# Patient Record
Sex: MALE | Race: WHITE | NOT HISPANIC OR LATINO | URBAN - METROPOLITAN AREA
[De-identification: names, ages, dates, MRNs, and addresses within clinical notes are randomized per-mention and may not be internally consistent; named-entity substitution may affect disease eponyms.]

---

## 2019-02-25 ENCOUNTER — COMMUNICATION - HEALTHEAST (OUTPATIENT)
Dept: NEUROSURGERY | Facility: CLINIC | Age: 59
End: 2019-02-25

## 2019-03-05 ENCOUNTER — RECORDS - HEALTHEAST (OUTPATIENT)
Dept: RADIOLOGY | Facility: CLINIC | Age: 59
End: 2019-03-05

## 2019-03-05 ENCOUNTER — RECORDS - HEALTHEAST (OUTPATIENT)
Dept: ADMINISTRATIVE | Facility: OTHER | Age: 59
End: 2019-03-05

## 2019-03-11 ENCOUNTER — RECORDS - HEALTHEAST (OUTPATIENT)
Dept: ADMINISTRATIVE | Facility: OTHER | Age: 59
End: 2019-03-11

## 2019-03-15 ENCOUNTER — AMBULATORY - HEALTHEAST (OUTPATIENT)
Dept: NEUROSURGERY | Facility: CLINIC | Age: 59
End: 2019-03-15

## 2019-03-15 ENCOUNTER — OFFICE VISIT - HEALTHEAST (OUTPATIENT)
Dept: NEUROSURGERY | Facility: CLINIC | Age: 59
End: 2019-03-15

## 2019-03-15 DIAGNOSIS — R29.898 WEAKNESS OF BOTH LOWER EXTREMITIES: ICD-10-CM

## 2019-03-15 RX ORDER — AMLODIPINE BESYLATE 10 MG/1
TABLET ORAL
Status: SHIPPED | COMMUNITY
Start: 2018-04-23

## 2019-03-15 RX ORDER — LISINOPRIL 40 MG/1
TABLET ORAL
Status: SHIPPED | COMMUNITY
Start: 2019-01-22

## 2019-03-15 RX ORDER — HYDROCHLOROTHIAZIDE 50 MG/1
TABLET ORAL
Status: SHIPPED | COMMUNITY
Start: 2019-02-11

## 2019-03-15 RX ORDER — METFORMIN HCL 500 MG
TABLET, EXTENDED RELEASE 24 HR ORAL
Status: SHIPPED | COMMUNITY
Start: 2019-02-07

## 2019-03-15 RX ORDER — METOPROLOL TARTRATE 50 MG
TABLET ORAL
Status: SHIPPED | COMMUNITY
Start: 2018-05-14

## 2019-03-15 RX ORDER — ATORVASTATIN CALCIUM 10 MG/1
TABLET, FILM COATED ORAL
Status: SHIPPED | COMMUNITY
Start: 2018-05-01

## 2019-03-15 RX ORDER — GEMFIBROZIL 600 MG/1
TABLET, FILM COATED ORAL
Status: SHIPPED | COMMUNITY
Start: 2019-02-25

## 2019-03-15 ASSESSMENT — MIFFLIN-ST. JEOR: SCORE: 1933.69

## 2019-05-02 ENCOUNTER — RECORDS - HEALTHEAST (OUTPATIENT)
Dept: ADMINISTRATIVE | Facility: OTHER | Age: 59
End: 2019-05-02

## 2019-05-20 ENCOUNTER — RECORDS - HEALTHEAST (OUTPATIENT)
Dept: ADMINISTRATIVE | Facility: OTHER | Age: 59
End: 2019-05-20

## 2019-05-23 ENCOUNTER — RECORDS - HEALTHEAST (OUTPATIENT)
Dept: ADMINISTRATIVE | Facility: OTHER | Age: 59
End: 2019-05-23

## 2019-05-23 ENCOUNTER — AMBULATORY - HEALTHEAST (OUTPATIENT)
Dept: NEUROSURGERY | Facility: CLINIC | Age: 59
End: 2019-05-23

## 2021-05-24 ENCOUNTER — RECORDS - HEALTHEAST (OUTPATIENT)
Dept: ADMINISTRATIVE | Facility: CLINIC | Age: 61
End: 2021-05-24

## 2021-05-29 ENCOUNTER — RECORDS - HEALTHEAST (OUTPATIENT)
Dept: ADMINISTRATIVE | Facility: CLINIC | Age: 61
End: 2021-05-29

## 2021-06-02 VITALS — BODY MASS INDEX: 32.51 KG/M2 | WEIGHT: 240 LBS | HEIGHT: 72 IN

## 2021-06-09 ENCOUNTER — RECORDS - HEALTHEAST (OUTPATIENT)
Dept: ADMINISTRATIVE | Facility: CLINIC | Age: 61
End: 2021-06-09

## 2021-06-24 NOTE — TELEPHONE ENCOUNTER
Called patient to get him scheduled to see Dr. Carmichael. Patient would like to check with his insurance company first then will call us back.

## 2021-06-25 NOTE — PROGRESS NOTES
Neurosurgery consultation was requested by: Self referral   Pain: Minor back pain   Radicular Pain is present:  Pain in from knees down to feet   Lhermitte sign: o  Motor complaints: Weakness in both legs   Sensory complaints: Numbness in both legs   Gait and balance issues: Yes  Bowel or bladder issues: Denies   Duration of SX is: Year   The symptoms are worse with:Interchangeable   The symptoms are better with: Nothing   Injury: Denies   Severity is: Mild - moderate  Patient has tried the following conservative measures: None   [place] score is:  ED Rojas

## 2021-06-25 NOTE — PROGRESS NOTES
Patient is a 59-year-old male.  He had a cervical laminoplasty by me 2010.  He now complains of weakness in both legs, worse on the left.  He says he walks like an old man.  He complains of numbness in his feet bilateral.  No tingling.  No significant back or leg pain.  No significant symptoms in arms or hands.  No trouble with bladder control.  He does have a history of diabetes.  He is not on insulin.  His medical history is otherwise unremarkable.  On exam he is well-developed and well-nourished.  He is obese with a BMI of 33.  His gait is okay.  He can walk on tiptoes and heels.  He is not hyperreflexic.  No clonus.  No upgoing toes.  Good strength.  No atrophy.  No fasciculations.  Numb feet.  Reviewed the MRI pictures with him.  He does not have spinal stenosis.  Plan MRI cervical and thoracic spine and neurology consult.  The patient is satisfied with the plan.  Total time 30 minutes, more than 50% spent counseling and/or coordinating care.